# Patient Record
(demographics unavailable — no encounter records)

---

## 2025-01-13 NOTE — HEALTH RISK ASSESSMENT
[Very Good] : ~his/her~  mood as very good [Yes] : Yes [2 - 4 times a month (2 pts)] : 2-4 times a month (2 points) [1 or 2 (0 pts)] : 1 or 2 (0 points) [Never (0 pts)] : Never (0 points) [0] : 2) Feeling down, depressed, or hopeless: Not at all (0) [PHQ-2 Negative - No further assessment needed] : PHQ-2 Negative - No further assessment needed [Never] : Never [None] : None [With Significant Other] : lives with significant other [Employed] : employed [Significant Other] : lives with significant other [[id number]] : 0 [High Risk Behavior] : no high risk behavior

## 2025-01-13 NOTE — PHYSICAL EXAM
[No Acute Distress] : no acute distress [EOMI] : extraocular movements intact [Normal TMs] : both tympanic membranes were normal [Supple] : supple [Clear to Auscultation] : lungs were clear to auscultation bilaterally [Regular Rhythm] : with a regular rhythm [Soft] : abdomen soft [Coordination Grossly Intact] : coordination grossly intact [Normal Gait] : normal gait [Speech Grossly Normal] : speech grossly normal [Normal Mood] : the mood was normal

## 2025-01-13 NOTE — REVIEW OF SYSTEMS
[Fever] : no fever [Night Sweats] : no night sweats [Palpitations] : no palpitations [Shortness Of Breath] : no shortness of breath [Nausea] : no nausea

## 2025-01-13 NOTE — HISTORY OF PRESENT ILLNESS
[de-identified] : R sciatica has been bothering patient.  Seeing Chiro and taking NSAIDs.  No nausea, vomiting, diarrhea, and constipation. No chest pain or shortness of breath.

## 2025-03-04 NOTE — HISTORY OF PRESENT ILLNESS
[FreeTextEntry1] : Ms. Vincent presents for initial evaluation and management of syncope (vasovagal), dyslipidemia, alopecia, anxiety, Hashimoto's thyroiditis, hypothyroidism, OA, and rosacea.  She was previously followed by another cardiologist, Pao Romano MD.  She has had multiple episodes of syncope since age 21. She has not had a workup prior.  On 01/31/25 while in [place], she had an episode of syncope after smoking marijuana for the first time in many years.  She struck her left occipital area, did not sustain any major injury, and went to the ED at the [facility] in [place].  She had benign labs and ECG and was discharged home.  Since that time, she has not had recurrence of syncope.  We had an extensive discussion about the possible etiology of her syncope.  I informed her she appears to be having neurally mediated (specifically vasovagal) syncope, and we reviewed strategies to avoid precipitants as well as abortive maneuvers should she experience a recurrent event. In addition, we reviewed her steadily increasing LDL and the need to initiate lipid modifying therapy to reduce her chance of a cardiovascular event in the future.

## 2025-03-04 NOTE — ASSESSMENT
[FreeTextEntry1] : ======================================================================================= 1. Dyslipidemia:  (01/13/25):   - will initiate atorvastatin 20mg po qd (possible adverse effects of new medications discussed)  - discussed therapeutic lifestyle changes to promote improved lipid metabolism  2. Syncope:  - discussed with patient preventive strategies including maintaining adequate volume status, avoiding prolonged standing, or skipping meals as well as abortive maneuvers including physical counter pressure  - will send for an exercise stress echocardiogram to rule out inducible ischemia or dysrhythmia   - will consider ordering a cardiac rhythm monitor in the future     I spent > 45 minutes of total time on this visit, including time spent face-to-face and non-face-to-face.  During this time, I took a relevant history and examined the patient.  I reviewed relevant portions of the medical record and formulated a differential diagnosis and plan.  I explained the relevant cardiac diagnoses to the patient, as well as the work up and management plan.  I answered all questions related to the patient's cardiac conditions.

## 2025-03-04 NOTE — HISTORY OF PRESENT ILLNESS
[FreeTextEntry1] : Ms. Vincent presents for initial evaluation and management of syncope (vasovagal), dyslipidemia, alopecia, anxiety, Hashimoto's thyroiditis, hypothyroidism, OA, and rosacea.  She was previously followed by another cardiologist, Pao Romano MD.  She has had multiple episodes of syncope since age 21. She has not had a workup prior.  On 01/31/25 while in [place], she had an episode of syncope after smoking marijuana for the first time in many years.  She struck her left occipital area, did not sustain any major injury, and went to the ED at the [facility] in [place].  She had benign labs and ECG and was discharged home.  Since that time, she has not had recurrence of syncope.  We had an extensive discussion about the possible etiology of her syncope.  I informed her she appears to be having neurally mediated (specifically vasovagal) syncope, and we reviewed strategies to avoid precipitants as well as abortive maneuvers should she experience a recurrent event. In addition, we reviewed her steadily increasing LDL and the need to initiate lipid modifying therapy to reduce her chance of a cardiovascular event in the future.

## 2025-03-04 NOTE — REASON FOR VISIT
[Other: ____] : [unfilled] [FreeTextEntry1] : ======================================================================= Diagnostic Tests: -------------------------------------------------------------- EC25: sinus rhythm, normal ECG.  -------------------------------------------------------------- Stress tests: 16: ETT: no angina, + ECG post 12 METS.

## 2025-06-01 NOTE — HISTORY OF PRESENT ILLNESS
[FreeTextEntry1] : Ms. Vincent presents for follow up and management of syncope (vasovagal), dyslipidemia, alopecia, anxiety, Hashimoto's thyroiditis, hypothyroidism, OA, and rosacea.  She was previously followed by another cardiologist, Pao Romano MD.  She has had multiple episodes of syncope since age 21. She has not had a workup prior.  On 01/31/25 while in [place], she had an episode of syncope after smoking marijuana for the first time in many years.  She struck her left occipital area, did not sustain any major injury, and went to the ED at the [facility] in [place].  She had benign labs and ECG and was discharged home.  Since that time, she has not had recurrence of syncope.  We had an extensive discussion about the possible etiology of her syncope.  I informed her she appears to be having neurally mediated (specifically vasovagal) syncope, and we reviewed strategies to avoid precipitants as well as abortive maneuvers should she experience a recurrent event. In addition, we reviewed her steadily increasing LDL and the need to initiate lipid modifying therapy to reduce her chance of a cardiovascular event in the future.

## 2025-06-01 NOTE — HISTORY OF PRESENT ILLNESS
[FreeTextEntry1] : Ms. Vincent presents for follow up and management of syncope (vasovagal), dyslipidemia, alopecia, anxiety, Hashimoto's thyroiditis, hypothyroidism, OA, and rosacea.  She was previously followed by another cardiologist, Pao Romano MD.  She has had multiple episodes of syncope since age 21. She has not had a workup prior.  On 01/31/25 while in [place], she had an episode of syncope after smoking marijuana for the first time in many years.  She struck her left occipital area, did not sustain any major injury, and went to the ED at the [facility] in [place].  She had benign labs and ECG and was discharged home.  Since that time, she has not had recurrence of syncope.  We had an extensive discussion about the possible etiology of her syncope.  I informed her she appears to be having neurally mediated (specifically vasovagal) syncope, and we reviewed strategies to avoid precipitants as well as abortive maneuvers should she experience a recurrent event. In addition, we reviewed her steadily increasing LDL and the need to initiate lipid modifying therapy to reduce her chance of a cardiovascular event in the future.   [employer]